# Patient Record
Sex: FEMALE | URBAN - METROPOLITAN AREA
[De-identification: names, ages, dates, MRNs, and addresses within clinical notes are randomized per-mention and may not be internally consistent; named-entity substitution may affect disease eponyms.]

---

## 2019-08-07 ENCOUNTER — APPOINTMENT (RX ONLY)
Dept: URBAN - METROPOLITAN AREA CLINIC 111 | Facility: CLINIC | Age: 24
Setting detail: DERMATOLOGY
End: 2019-08-07

## 2019-08-07 DIAGNOSIS — Z41.9 ENCOUNTER FOR PROCEDURE FOR PURPOSES OTHER THAN REMEDYING HEALTH STATE, UNSPECIFIED: ICD-10-CM

## 2019-08-07 PROCEDURE — ? DERMAPLANE

## 2019-08-07 PROCEDURE — ? HYDRAFACIAL

## 2019-08-07 ASSESSMENT — LOCATION DETAILED DESCRIPTION DERM
LOCATION DETAILED: LEFT CENTRAL MALAR CHEEK
LOCATION DETAILED: RIGHT CENTRAL MALAR CHEEK

## 2019-08-07 ASSESSMENT — LOCATION ZONE DERM: LOCATION ZONE: FACE

## 2019-08-07 ASSESSMENT — LOCATION SIMPLE DESCRIPTION DERM
LOCATION SIMPLE: LEFT CHEEK
LOCATION SIMPLE: RIGHT CHEEK

## 2019-08-07 NOTE — PROCEDURE: DERMAPLANE
Treatment Area Prep: alcohol
Treatment Areas: face
Detail Level: Zone
Pre-Procedure Text: The patient was placed in a recumbant position on the procedure table.
Post-Procedure Instructions: Following the dermaplane procedure, Alto serum and hydration boosting cream applied to the treatment areas. Elta Elements SPF was applied.
Post-Care Instructions: I reviewed with the patient in detail post-care instructions.
Price (Use Numbers Only, No Special Characters Or $): 50
Blade: 10 blade scalpel

## 2019-08-07 NOTE — PROCEDURE: HYDRAFACIAL
Number Of Passes: 2
Glycolic Acid %: 7.5%
Additional Vacuum Pressure (Won't Render If 0): 0
Post-Care Instructions: I reviewed with the patient in detail post-care instructions. Patient should stay away from the sun and wear sun protection until treated areas are fully healed.
Treatment Number: 1
Vacuum Pressure: 16
Detail Level: Zone
Price (Use Numbers Only, No Special Characters Or $): 175
Indication: dehydrated skin
Consent: Written consent obtained, risks reviewed including but not limited to crusting, scabbing, blistering, scarring, darker or lighter pigmentary change, bruising, and/or incomplete response.
Additional Vacuum Pressure (Won't Render If 0): 22

## 2020-05-13 ENCOUNTER — APPOINTMENT (RX ONLY)
Dept: URBAN - METROPOLITAN AREA CLINIC 111 | Facility: CLINIC | Age: 25
Setting detail: DERMATOLOGY
End: 2020-05-13

## 2020-05-13 DIAGNOSIS — U07.1 COVID-19: ICD-10-CM

## 2020-05-13 PROBLEM — U07.2 COVID-19, VIRUS NOT IDENTIFIED: Status: ACTIVE | Noted: 2020-05-13

## 2020-05-13 PROCEDURE — ? ORDER TESTS

## 2020-05-13 NOTE — PROCEDURE: ORDER TESTS
Billing Type: Third-Party Bill
Expected Date Of Service: 05/13/2020
Performing Laboratory: [id number]
Lab Facility: [id number]
Bill For Surgical Tray: no

## 2021-08-05 ENCOUNTER — APPOINTMENT (RX ONLY)
Dept: URBAN - METROPOLITAN AREA CLINIC 111 | Facility: CLINIC | Age: 26
Setting detail: DERMATOLOGY
End: 2021-08-05

## 2021-08-05 DIAGNOSIS — R53.83 OTHER FATIGUE: ICD-10-CM

## 2021-08-05 DIAGNOSIS — L65.9 NONSCARRING HAIR LOSS, UNSPECIFIED: ICD-10-CM

## 2021-08-05 PROCEDURE — ? ORDER TESTS

## 2021-08-05 NOTE — PROCEDURE: ORDER TESTS
Expected Date Of Service: 08/05/2021
Performing Laboratory: 0
Billing Type: Third-Party Bill
Lab Facility: [id number]
Bill For Surgical Tray: no

## 2024-05-06 ENCOUNTER — APPOINTMENT (RX ONLY)
Dept: URBAN - METROPOLITAN AREA CLINIC 111 | Facility: CLINIC | Age: 29
Setting detail: DERMATOLOGY
End: 2024-05-06

## 2024-05-06 DIAGNOSIS — R21 RASH AND OTHER NONSPECIFIC SKIN ERUPTION: ICD-10-CM | Status: INADEQUATELY CONTROLLED

## 2024-05-06 PROCEDURE — 99204 OFFICE O/P NEW MOD 45 MIN: CPT

## 2024-05-06 PROCEDURE — ? DIAGNOSIS COMMENT

## 2024-05-06 PROCEDURE — ? COUNSELING

## 2024-05-06 PROCEDURE — ? PRESCRIPTION MEDICATION MANAGEMENT

## 2024-05-06 PROCEDURE — ? PRESCRIPTION

## 2024-05-06 RX ORDER — HYDROCORTISONE 25 MG/G
OINTMENT TOPICAL
Qty: 28.35 | Refills: 2 | Status: ERX | COMMUNITY
Start: 2024-05-06

## 2024-05-06 RX ORDER — TACROLIMUS 1 MG/G
OINTMENT TOPICAL
Qty: 60 | Refills: 5 | Status: ERX | COMMUNITY
Start: 2024-05-06

## 2024-05-06 RX ADMIN — HYDROCORTISONE: 25 OINTMENT TOPICAL at 00:00

## 2024-05-06 RX ADMIN — TACROLIMUS: 1 OINTMENT TOPICAL at 00:00

## 2024-05-06 ASSESSMENT — LOCATION ZONE DERM: LOCATION ZONE: VULVA

## 2024-05-06 ASSESSMENT — LOCATION DETAILED DESCRIPTION DERM: LOCATION DETAILED: MONS PUBIS

## 2024-05-06 ASSESSMENT — LOCATION SIMPLE DESCRIPTION DERM: LOCATION SIMPLE: GROIN

## 2024-05-06 NOTE — PROCEDURE: DIAGNOSIS COMMENT
Render Risk Assessment In Note?: no
Detail Level: Simple
Comment: Patient reports that she has flare ups during menstruation.  Likely eczematous / hypersensitivity reaction hormonally related.  Advised patient that with topicals rash should improve with tacrolimus/ barrier creams with pad.

## 2024-05-06 NOTE — PROCEDURE: PRESCRIPTION MEDICATION MANAGEMENT
Render In Strict Bullet Format?: Yes
Initiate Treatment: -\\n- tacrolimus 0.1 % topical ointment: Apply to affected area (genitalia) twice daily. Can mix with CeraVe Healing ointment if stinging occurs.\\n- hydrocortisone 2.5 % topical ointment: Apply to affected areas (genitalia) twice daily for 2 weeks on, then 1 week off. Repeat cycle as needed for flares.
Detail Level: Zone

## 2024-05-06 NOTE — HPI: RASH
What Type Of Note Output Would You Prefer (Optional)?: Bullet Format
How Severe Is Your Rash?: moderate
Is This A New Presentation, Or A Follow-Up?: Rash
Additional History: Previously prescribed a topical antibiotic and mild steroid cream with improvement.

## 2024-05-07 RX ORDER — TACROLIMUS 1 MG/G
OINTMENT TOPICAL
Qty: 60 | Refills: 5 | Status: ERX

## 2025-08-26 ENCOUNTER — RX ONLY (RX ONLY)
Age: 30
End: 2025-08-26

## 2025-08-26 RX ORDER — HYDROCORTISONE 25 MG/G
OINTMENT TOPICAL
Qty: 28.35 | Refills: 2 | Status: ERX